# Patient Record
Sex: FEMALE | NOT HISPANIC OR LATINO | Employment: FULL TIME | URBAN - METROPOLITAN AREA
[De-identification: names, ages, dates, MRNs, and addresses within clinical notes are randomized per-mention and may not be internally consistent; named-entity substitution may affect disease eponyms.]

---

## 2017-07-13 ENCOUNTER — RECORDS - HEALTHEAST (OUTPATIENT)
Dept: LAB | Facility: HOSPITAL | Age: 57
End: 2017-07-13

## 2017-07-14 LAB — HBV SURFACE AB SERPL IA-ACNC: POSITIVE M[IU]/ML

## 2024-03-11 ENCOUNTER — TRANSFERRED RECORDS (OUTPATIENT)
Dept: HEALTH INFORMATION MANAGEMENT | Facility: CLINIC | Age: 64
End: 2024-03-11

## 2024-03-11 LAB
CHOLESTEROL (EXTERNAL): 302 MG/DL (ref 0–200)
CREATININE (EXTERNAL): 0.9 MG/DL (ref 0.7–1.4)
GFR ESTIMATED (EXTERNAL): >60 ML/MIN/1.73M2
GLUCOSE (EXTERNAL): 94 MG/DL (ref 60–99)
HDLC SERPL-MCNC: 76 MG/DL (ref 35–65)
LDL CHOLESTEROL CALCULATED (EXTERNAL): 201 MG/DL (ref 0–130)
POTASSIUM (EXTERNAL): 4.4 MMOL/L (ref 3.5–5.1)
TRIGLYCERIDES (EXTERNAL): 125 MG/DL (ref 0–200)
TSH SERPL-ACNC: 1.12 MIU/L (ref 0.47–4.68)

## 2024-03-21 ENCOUNTER — TRANSFERRED RECORDS (OUTPATIENT)
Dept: HEALTH INFORMATION MANAGEMENT | Facility: CLINIC | Age: 64
End: 2024-03-21

## 2024-04-03 ENCOUNTER — MEDICAL CORRESPONDENCE (OUTPATIENT)
Dept: HEALTH INFORMATION MANAGEMENT | Facility: CLINIC | Age: 64
End: 2024-04-03
Payer: COMMERCIAL

## 2024-04-09 ENCOUNTER — OFFICE VISIT (OUTPATIENT)
Dept: CARDIOLOGY | Facility: CLINIC | Age: 64
End: 2024-04-09
Payer: COMMERCIAL

## 2024-04-09 VITALS
HEART RATE: 89 BPM | HEIGHT: 64 IN | WEIGHT: 154 LBS | SYSTOLIC BLOOD PRESSURE: 144 MMHG | DIASTOLIC BLOOD PRESSURE: 80 MMHG | RESPIRATION RATE: 16 BRPM | BODY MASS INDEX: 26.29 KG/M2

## 2024-04-09 DIAGNOSIS — E78.5 HYPERLIPIDEMIA LDL GOAL <130: ICD-10-CM

## 2024-04-09 DIAGNOSIS — R00.2 PALPITATIONS: Primary | ICD-10-CM

## 2024-04-09 PROCEDURE — 99204 OFFICE O/P NEW MOD 45 MIN: CPT | Performed by: INTERNAL MEDICINE

## 2024-04-09 RX ORDER — ROSUVASTATIN CALCIUM 10 MG/1
10 TABLET, COATED ORAL
COMMUNITY
Start: 2024-03-11 | End: 2024-09-07

## 2024-04-09 RX ORDER — EPINEPHRINE 0.3 MG/.3ML
0.3 INJECTION SUBCUTANEOUS
COMMUNITY
Start: 2022-09-20

## 2024-04-09 NOTE — LETTER
4/9/2024    Erick Doty MD  [facility] [address] [place] [id number]    RE: Amanda Epstein       Dear Colleague,     I had the pleasure of seeing Amanda Epstein in the [facility].    [facility]   [address]  [place] [address]   [email or web address]   OFFICE: [phone]     CARDIOLOGY CLINIC NOTE     Thank you, Dr. Doty, Erick COLLAZO, for asking the [facility] team to see Ms. Amanda Epstein to evaluate New Patient         Assessment/Recommendations   Assessment:    Irregular heartbeat - etiology of this symptom is not clear as her Zio patch did not reveal any significant arrhythmias associated with her symptoms. No concerning findings on the monitor. No further evaluation is necessary at this time.  Hyperlipidemia - Amanda is making significant dietary and lifestyle changes in an effort to control lipids without medications.    Plan:  Agree with echo and CT cardiac calcium score. If CAC is <100 then continued lifestyle changes for lipid management is reasonable. If >100 then would initiate statin therapy in addition to lifestyle.   Encouraged healthy diet and regular exercise  Follow up with me as needed with additional cardiac questions or concerns.         History of Present Illness   Ms. Amanda Epstein is a 63 year old generally healthy female who presents for evaluation of irregular heartbeat.    Ms. Epstein had symptoms of an irregular heartbeat that lasted a few weeks.  When the symptoms progressed and became more frequent she presented to urgent care where her EKG was normal and other testing was overall unremarkable.  She was later evaluated with a Zio patch monitor was also reassuring.  It did demonstrate a few short bursts of NSVT but these were asymptomatic.  Her symptom events did not correlate with any significant arrhythmias.  Her symptoms have since resolved.  She was also  "noted to have significantly elevated cholesterol levels and has made conscious dietary changes to significantly reduce her cholesterol.  With these dietary changes she is already lost 12 pounds.  She is generally reasonably active and denies exertional cardiorespiratory symptoms.    Other than noted above, Ms. Epstein denies any chest pain/pressure/tightness, shortness of breath at rest or with exertion, light headedness/dizziness, pre-syncope, syncope, lower extremity swelling, palpitations, paroxysmal nocturnal dyspnea (PND), or orthopnea.     Cardiac Problems and Cardiac Diagnostics     Most Recent Cardiac testing:  ECG dated 3/11/24 (personaly reviewed and interpreted): sinus rhythm without ischemic changes.    Zio Patch monitor from 3/11-21/24  HR range  with average 76 bpm  5 episodes of SVT lasting up to 13 beats at a rate up to 182 bpm. No VT, pauses, AV block, or atrial fibrillation. Very rare PACs or PVCs.          Medications  Allergies   Current Outpatient Medications   Medication Sig Dispense Refill    EPINEPHrine (EPIPEN 2-JOSE) 0.3 MG/0.3ML injection 2-pack Inject 0.3 mg into the muscle      rosuvastatin (CRESTOR) 10 MG tablet Take 10 mg by mouth (Patient not taking: Reported on 4/9/2024)        Allergies   Allergen Reactions    Bee Venom     Sulfa Antibiotics Swelling        Physical Examination Review of Systems   Vitals: BP (!) 144/80 (BP Location: Left arm, Patient Position: Sitting, Cuff Size: Adult Regular)   Pulse 89   Resp 16   Ht 1.626 m (5' 4\")   Wt 69.9 kg (154 lb)   BMI 26.43 kg/m    BMI= Body mass index is 26.43 kg/m .  Wt Readings from Last 3 Encounters:   04/09/24 69.9 kg (154 lb)       General: pleasant female. No acute distress.   Neck: No JVD  Lungs: clear to auscultation  COR: normal rate, regular rhythm, No murmurs, rubs, or gallops  Extrem: No edema        Please refer above for cardiac ROS details.       Past History       Social History:   Social History " "    Socioeconomic History    Marital status:      Spouse name: Not on file    Number of children: Not on file    Years of education: Not on file    Highest education level: Not on file   Occupational History    Not on file   Tobacco Use    Smoking status: Never    Smokeless tobacco: Never   Substance and Sexual Activity    Alcohol use: Never    Drug use: Never    Sexual activity: Not on file   Other Topics Concern    Not on file   Social History Narrative    Not on file     Social Determinants of Health     Financial Resource Strain: Not on file   Food Insecurity: Not on file   Transportation Needs: Not on file   Physical Activity: Not on file   Stress: Not on file   Social Connections: Not on file   Interpersonal Safety: Not on file   Housing Stability: Not on file            Lab Results    Chemistry/lipid CBC Cardiac Enzymes/BNP/TSH/INR   No results found for: \"CHOL\", \"HDL\", \"TRIG\", \"CHOLHDL\", \"CREATININE\", \"BUN\", \"NA\", \"CO2\" No results found for: \"WBC\", \"HGB\", \"HCT\", \"MCV\", \"PLT\" No results found for: \"CKTOTAL\", \"CKMB\", \"TROPONINI\", \"BNP\", \"TSH\", \"INR\"             Thank you for allowing me to participate in the care of your patient.      Sincerely,     Francois Aguirre MD     [facility]  cc:   Erick Doty MD  [facility]  [address] [place] [address]      "

## 2024-04-09 NOTE — PROGRESS NOTES
[facility]   [address]  [place] [address]   [email or web address]   OFFICE: [phone]     CARDIOLOGY CLINIC NOTE     Thank you, Erick Greenberg, for asking the [facility] team to see Ms. Amanda Epstein to evaluate New Patient         Assessment/Recommendations   Assessment:    Irregular heartbeat - etiology of this symptom is not clear as her Zio patch did not reveal any significant arrhythmias associated with her symptoms. No concerning findings on the monitor. No further evaluation is necessary at this time.  Hyperlipidemia - Amanda is making significant dietary and lifestyle changes in an effort to control lipids without medications.    Plan:  Agree with echo and CT cardiac calcium score. If CAC is <100 then continued lifestyle changes for lipid management is reasonable. If >100 then would initiate statin therapy in addition to lifestyle.   Encouraged healthy diet and regular exercise  Follow up with me as needed with additional cardiac questions or concerns.         History of Present Illness   Ms. Amanda Epstein is a 63 year old generally healthy female who presents for evaluation of irregular heartbeat.    Ms. Epstein had symptoms of an irregular heartbeat that lasted a few weeks.  When the symptoms progressed and became more frequent she presented to urgent care where her EKG was normal and other testing was overall unremarkable.  She was later evaluated with a Zio patch monitor was also reassuring.  It did demonstrate a few short bursts of NSVT but these were asymptomatic.  Her symptom events did not correlate with any significant arrhythmias.  Her symptoms have since resolved.  She was also noted to have significantly elevated cholesterol levels and has made conscious dietary changes to significantly reduce her cholesterol.  With these dietary changes she is already lost 12 pounds.  She is generally reasonably active  "and denies exertional cardiorespiratory symptoms.    Other than noted above, Ms. Epstein denies any chest pain/pressure/tightness, shortness of breath at rest or with exertion, light headedness/dizziness, pre-syncope, syncope, lower extremity swelling, palpitations, paroxysmal nocturnal dyspnea (PND), or orthopnea.     Cardiac Problems and Cardiac Diagnostics     Most Recent Cardiac testing:  ECG dated 3/11/24 (personaly reviewed and interpreted): sinus rhythm without ischemic changes.    Zio Patch monitor from 3/11-21/24  HR range  with average 76 bpm  5 episodes of SVT lasting up to 13 beats at a rate up to 182 bpm. No VT, pauses, AV block, or atrial fibrillation. Very rare PACs or PVCs.          Medications  Allergies   Current Outpatient Medications   Medication Sig Dispense Refill    EPINEPHrine (EPIPEN 2-JOSE) 0.3 MG/0.3ML injection 2-pack Inject 0.3 mg into the muscle      rosuvastatin (CRESTOR) 10 MG tablet Take 10 mg by mouth (Patient not taking: Reported on 4/9/2024)        Allergies   Allergen Reactions    Bee Venom     Sulfa Antibiotics Swelling        Physical Examination Review of Systems   Vitals: BP (!) 144/80 (BP Location: Left arm, Patient Position: Sitting, Cuff Size: Adult Regular)   Pulse 89   Resp 16   Ht 1.626 m (5' 4\")   Wt 69.9 kg (154 lb)   BMI 26.43 kg/m    BMI= Body mass index is 26.43 kg/m .  Wt Readings from Last 3 Encounters:   04/09/24 69.9 kg (154 lb)       General: pleasant female. No acute distress.   Neck: No JVD  Lungs: clear to auscultation  COR: normal rate, regular rhythm, No murmurs, rubs, or gallops  Extrem: No edema        Please refer above for cardiac ROS details.       Past History       Social History:   Social History     Socioeconomic History    Marital status:      Spouse name: Not on file    Number of children: Not on file    Years of education: Not on file    Highest education level: Not on file   Occupational History    Not on file   Tobacco Use " "   Smoking status: Never    Smokeless tobacco: Never   Substance and Sexual Activity    Alcohol use: Never    Drug use: Never    Sexual activity: Not on file   Other Topics Concern    Not on file   Social History Narrative    Not on file     Social Determinants of Health     Financial Resource Strain: Not on file   Food Insecurity: Not on file   Transportation Needs: Not on file   Physical Activity: Not on file   Stress: Not on file   Social Connections: Not on file   Interpersonal Safety: Not on file   Housing Stability: Not on file            Lab Results    Chemistry/lipid CBC Cardiac Enzymes/BNP/TSH/INR   No results found for: \"CHOL\", \"HDL\", \"TRIG\", \"CHOLHDL\", \"CREATININE\", \"BUN\", \"NA\", \"CO2\" No results found for: \"WBC\", \"HGB\", \"HCT\", \"MCV\", \"PLT\" No results found for: \"CKTOTAL\", \"CKMB\", \"TROPONINI\", \"BNP\", \"TSH\", \"INR\"         "

## 2024-04-09 NOTE — PATIENT INSTRUCTIONS
It was a pleasure to meet with you today.      Below is a summary of your visit.   I think it is reasonable to proceed with the echocardiogram and cardiac calcium score.  If your calcium score is <100 then the lifestyle changes you are making to improve your cholesterol numbers would likely be sufficient. If >100, I would recommend starting a statin based cholesterol medication in addition to your lifestyle changes.  I encourage regular exercise as tolerated. The more the better, which should include a combination of aerobic exercise and resistance training.   Your heart rhythm monitor was overall reassuring and did not show anything concerning to me.  Follow up with me as needed with new cardiac questions or concerns.    We will call you to inform you of your test or procedure results within 3 business days of the test being performed.  If you do not hear from our office with the test results within 1 week please do not hesitate to call asking for these results.     Please do not hesitate to call the [facility] with any questions or concerns at [phone]. You can also reach my nurse, Juana, at [phone].    Sincerely,